# Patient Record
Sex: MALE | Race: OTHER
[De-identification: names, ages, dates, MRNs, and addresses within clinical notes are randomized per-mention and may not be internally consistent; named-entity substitution may affect disease eponyms.]

---

## 2018-07-12 ENCOUNTER — HOSPITAL ENCOUNTER (EMERGENCY)
Dept: HOSPITAL 8 - ED | Age: 43
Discharge: LEFT BEFORE DISCHARGE | End: 2018-07-12
Payer: SELF-PAY

## 2018-07-12 DIAGNOSIS — R06.00: Primary | ICD-10-CM

## 2018-07-22 ENCOUNTER — HOSPITAL ENCOUNTER (EMERGENCY)
Dept: HOSPITAL 8 - ED | Age: 43
Discharge: HOME | End: 2018-07-22
Payer: SELF-PAY

## 2018-07-22 VITALS — HEIGHT: 73 IN | BODY MASS INDEX: 25.13 KG/M2 | WEIGHT: 189.6 LBS

## 2018-07-22 VITALS — DIASTOLIC BLOOD PRESSURE: 82 MMHG | SYSTOLIC BLOOD PRESSURE: 158 MMHG

## 2018-07-22 DIAGNOSIS — F15.10: Primary | ICD-10-CM

## 2018-07-22 DIAGNOSIS — R20.2: ICD-10-CM

## 2018-07-22 DIAGNOSIS — F19.10: ICD-10-CM

## 2018-07-22 DIAGNOSIS — Z72.9: ICD-10-CM

## 2018-07-22 DIAGNOSIS — Z91.14: ICD-10-CM

## 2018-07-22 DIAGNOSIS — F17.200: ICD-10-CM

## 2018-07-22 PROCEDURE — 99283 EMERGENCY DEPT VISIT LOW MDM: CPT

## 2018-08-02 ENCOUNTER — HOSPITAL ENCOUNTER (EMERGENCY)
Dept: HOSPITAL 8 - ED | Age: 43
Discharge: LEFT BEFORE DISCHARGE | End: 2018-08-02
Payer: SELF-PAY

## 2018-08-02 VITALS — HEIGHT: 72 IN | WEIGHT: 191.14 LBS | BODY MASS INDEX: 25.89 KG/M2

## 2018-08-02 VITALS — DIASTOLIC BLOOD PRESSURE: 82 MMHG | SYSTOLIC BLOOD PRESSURE: 115 MMHG

## 2018-08-02 DIAGNOSIS — F17.200: ICD-10-CM

## 2018-08-02 DIAGNOSIS — L02.414: Primary | ICD-10-CM

## 2018-08-02 DIAGNOSIS — F11.10: ICD-10-CM

## 2018-08-02 DIAGNOSIS — F15.10: ICD-10-CM

## 2018-08-02 PROCEDURE — 99281 EMR DPT VST MAYX REQ PHY/QHP: CPT

## 2020-01-21 ENCOUNTER — HOSPITAL ENCOUNTER (INPATIENT)
Dept: HOSPITAL 8 - ED | Age: 45
LOS: 1 days | Discharge: HOME | DRG: 133 | End: 2020-01-22
Attending: HOSPITALIST | Admitting: HOSPITALIST
Payer: MEDICAID

## 2020-01-21 VITALS — DIASTOLIC BLOOD PRESSURE: 76 MMHG | SYSTOLIC BLOOD PRESSURE: 108 MMHG

## 2020-01-21 VITALS — HEIGHT: 74 IN | BODY MASS INDEX: 30.19 KG/M2 | WEIGHT: 235.23 LBS

## 2020-01-21 VITALS — DIASTOLIC BLOOD PRESSURE: 74 MMHG | SYSTOLIC BLOOD PRESSURE: 107 MMHG

## 2020-01-21 DIAGNOSIS — J96.01: Primary | ICD-10-CM

## 2020-01-21 DIAGNOSIS — F32.9: ICD-10-CM

## 2020-01-21 DIAGNOSIS — T40.2X5A: ICD-10-CM

## 2020-01-21 DIAGNOSIS — Z88.8: ICD-10-CM

## 2020-01-21 DIAGNOSIS — J15.9: ICD-10-CM

## 2020-01-21 DIAGNOSIS — G92: ICD-10-CM

## 2020-01-21 DIAGNOSIS — Z87.820: ICD-10-CM

## 2020-01-21 DIAGNOSIS — Z87.891: ICD-10-CM

## 2020-01-21 DIAGNOSIS — F43.10: ICD-10-CM

## 2020-01-21 DIAGNOSIS — F11.10: ICD-10-CM
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
## 2020-01-21 LAB
ALBUMIN SERPL-MCNC: 3.9 G/DL (ref 3.4–5)
ALP SERPL-CCNC: 81 U/L (ref 45–117)
ALT SERPL-CCNC: 100 U/L (ref 12–78)
ANION GAP SERPL CALC-SCNC: 11 MMOL/L (ref 5–15)
BASOPHILS # BLD AUTO: 0.05 X10^3/UL (ref 0–0.1)
BASOPHILS NFR BLD AUTO: 1 % (ref 0–1)
BILIRUB SERPL-MCNC: 1 MG/DL (ref 0.2–1)
CALCIUM SERPL-MCNC: 8.5 MG/DL (ref 8.5–10.1)
CHLORIDE SERPL-SCNC: 105 MMOL/L (ref 98–107)
CREAT SERPL-MCNC: 1.11 MG/DL (ref 0.7–1.3)
EOSINOPHIL # BLD AUTO: 0.16 X10^3/UL (ref 0–0.4)
EOSINOPHIL NFR BLD AUTO: 2 % (ref 1–7)
ERYTHROCYTE [DISTWIDTH] IN BLOOD BY AUTOMATED COUNT: 13.5 % (ref 9.4–14.8)
LYMPHOCYTES # BLD AUTO: 1.55 X10^3/UL (ref 1–3.4)
LYMPHOCYTES NFR BLD AUTO: 17 % (ref 22–44)
MCH RBC QN AUTO: 30.8 PG (ref 27.5–34.5)
MCHC RBC AUTO-ENTMCNC: 34 G/DL (ref 33.2–36.2)
MCV RBC AUTO: 90.6 FL (ref 81–97)
MD: NO
MONOCYTES # BLD AUTO: 0.5 X10^3/UL (ref 0.2–0.8)
MONOCYTES NFR BLD AUTO: 5 % (ref 2–9)
NEUTROPHILS # BLD AUTO: 7.01 X10^3/UL (ref 1.8–6.8)
NEUTROPHILS NFR BLD AUTO: 76 % (ref 42–75)
PLATELET # BLD AUTO: 297 X10^3/UL (ref 130–400)
PMV BLD AUTO: 8.2 FL (ref 7.4–10.4)
PROT SERPL-MCNC: 7.8 G/DL (ref 6.4–8.2)
RBC # BLD AUTO: 5.21 X10^6/UL (ref 4.38–5.82)
TROPONIN I SERPL-MCNC: < 0.015 NG/ML (ref 0–0.04)
VANCOMYCIN TROUGH SERPL-MCNC: 3.3 MG/DL (ref 2.8–20)

## 2020-01-21 PROCEDURE — 83880 ASSAY OF NATRIURETIC PEPTIDE: CPT

## 2020-01-21 PROCEDURE — 87040 BLOOD CULTURE FOR BACTERIA: CPT

## 2020-01-21 PROCEDURE — 84484 ASSAY OF TROPONIN QUANT: CPT

## 2020-01-21 PROCEDURE — 36415 COLL VENOUS BLD VENIPUNCTURE: CPT

## 2020-01-21 PROCEDURE — 87521 HEPATITIS C PROBE&RVRS TRNSC: CPT

## 2020-01-21 PROCEDURE — 96374 THER/PROPH/DIAG INJ IV PUSH: CPT

## 2020-01-21 PROCEDURE — 85025 COMPLETE CBC W/AUTO DIFF WBC: CPT

## 2020-01-21 PROCEDURE — 83605 ASSAY OF LACTIC ACID: CPT

## 2020-01-21 PROCEDURE — 80053 COMPREHEN METABOLIC PANEL: CPT

## 2020-01-21 PROCEDURE — 96361 HYDRATE IV INFUSION ADD-ON: CPT

## 2020-01-21 PROCEDURE — 80307 DRUG TEST PRSMV CHEM ANLYZR: CPT

## 2020-01-21 PROCEDURE — 80074 ACUTE HEPATITIS PANEL: CPT

## 2020-01-21 PROCEDURE — 71275 CT ANGIOGRAPHY CHEST: CPT

## 2020-01-21 PROCEDURE — 93005 ELECTROCARDIOGRAM TRACING: CPT

## 2020-01-21 PROCEDURE — 84145 PROCALCITONIN (PCT): CPT

## 2020-01-21 PROCEDURE — 71045 X-RAY EXAM CHEST 1 VIEW: CPT

## 2020-01-21 PROCEDURE — 96375 TX/PRO/DX INJ NEW DRUG ADDON: CPT

## 2020-01-21 PROCEDURE — 87400 INFLUENZA A/B EACH AG IA: CPT

## 2020-01-21 RX ADMIN — SODIUM CHLORIDE AND POTASSIUM CHLORIDE SCH MLS/HR: .9; .15 SOLUTION INTRAVENOUS at 22:42

## 2020-01-21 NOTE — NUR
PT HAS DC'D OWN PIV IN HAND, STATING IT WAS LEAKING BLOOD. PT EDUCATED NOT TO 
TOUCH LINES. PIV TO LEFT WRIST DC'D WITH TIP INTACT AT PT REQUEST AS THIS PIV 
WAS PAINFUL, RIGHT EJ REMAINS IN PLACE, FLUSHES WITH BLOOD RETURN.

## 2020-01-21 NOTE — NUR
MD Atkinson notified pt meeting sepsis criteria but not septic shock, MD notified 
pt's HR remains tachy at rate 100-110's. MD notified pt has only had total 2L 
NS. MD notified pt has c/o generalized pain. MD declined to order additional 
fluids at this time.

## 2020-01-21 NOTE — NUR
pt standing up trying to void.  assisted pt up out of bed.  pt steady on his 
feet.  pt still unable to void.  pt has urinal at bedside.  pt desats without 
o2 to 82%.  o2 placed back in nose.  will continue to monitor.

## 2020-01-21 NOTE — NUR
TAMEKA COLLINS FROM HOME FOR C/O POSSIBLE OD; PT. DENIES TAKING ANY MEDICAIONS.  PER 
EMS REPORT PT. WENT TO NEIGHBOR'S HOUSE TO REQUEST HELP AND NEIGHBOR'S PLACED 
PT. IN THE SHOWER WITH ALL CLOTING ON AND CALLED EMS.  ALL WET CLOTHING REMOVED 
ON ARRIVAL. 



REMSA STATES RA SAT WAS 50% ON RA ON THEIR ARRIVAL.  2MG NARCAN BY EMS EN 
ROUTE.  PT. REPORTS HX OF TBI. PT. A&O X 4. DENIES ANY PAIN OR DISCOMFORT. 



ON ARRIVAL TO ED PT. SPEAKING NON-STOP WITH NO RESP DISTRESS NOTED.  HOWEVER; 
PT. IS ON 15L NON-REBREATHER.  ATTEMPTED TO DECREASE O2 TO [place]  IMMEDIATE 
DECREASE TO 84% WITH 6L NC.  NON-REBREATHER REPLACED.  EKG DONE ON ARRIVAL.  
ALL MONITORS PLACED.  DR. GONZÁLES WAS IN TO EVAL PT. AND DISCUSS POC.  2ND IV 
ESTABLISHED.

## 2020-01-21 NOTE — NUR
PT BEING GIVEN MULTIPLE CUPS OF WATER TO HELP HIM VOID.  OFFERED TO CATH PT FOR 
EASURINE COLLECTION.  PT REFUSED.  PT CONTINUING TO DRINK WATER.

## 2020-01-21 NOTE — NUR
BREAK RN: PT AWAKE, ASKING FOR "SOMETHING TO DRINK"  DOA RESULTED +OPIATES AND 
AMPHETAMINES.  PT DENIES DRUG USE.  PLAN FOR CTA EXPLAINED TO PATIENT, AND PT 
INSTRUCTED NOTHING TO DRINK UNTIL CT IS COMPLETED.

## 2020-01-21 NOTE — NUR
PT A&OX4, RESPS EVEN AND UNLABORED, SINS TACH RATE 110'S ON CARDIAC MONITOR 
WITH NO ECTOPY. PT REPORTS GENERALIZED PAIN LEVEL 7/10. PT A&OX4, RSPS EVEN AND 
UNLABORED. AWAITING ADMISSION FOR PNA AT THIS TIME.

## 2020-01-21 NOTE — NUR
report received from DESEAN Salgado at bedside. pt a&o to person, place and 
date, disoriented to situation. pt keeps pulling oxygen off and desatting to 
mid 80% on room air. HR sinus tach 110-120. pt attempting to void with urinal 
and unable, refusing straight cath. MD River informed. pt to be admitted. MD 
informed that pt is confused, non-complaint, pinpoint pupils. RN instructed to 
admin 0.4 mg narcan previously ordered. pt placed on high flow oxy mask at 
12L/min, spo2 improved to >90% with this transition.

## 2020-01-21 NOTE — NUR
pt is responsive to narcan, spo2 improved, RR increased to 15 , speech more 
clear ,pupils increased from 2mm to 4mm.

## 2020-01-21 NOTE — NUR
pt continues to be unable to void, refusing cath. ERP Perico notified. Pt 
bladder scanned, residual is 47mL. ERP instructed RN to administer one 
additional liter NS bolus. cxr reviewed by ERP. all monitors in place, pt is 
sinus tach rate 100-110 with no ectopy. spo2 >95% on 12L oxymask. RR 14-20. pt 
is now a&ox4, resps even and unlabored. pt instructed to provide clean catch ua 
when able. urinal at bedside. being monitored closely by this RN.

## 2020-01-21 NOTE — NUR
PIV placed to right EJ by this RN with MD River's ok, MD bains'd use for CTA. Pt 
to CT with this RN and tech, now back in room. Pt tolerated well. pt a&o, resps 
even and unlabored. all monitors in place. VSS. awaiting CTA result and dispo.

## 2020-01-21 NOTE — NUR
PT CONTINUES TO ADAMENTLY REFUSE CATH FOR URINE.  PT CONTINUING TO DRINK WATER, 
HAS NOT MADE ANY URINE YET.

## 2020-01-21 NOTE — NUR
PT. PROVIDED WITH WATER PER REQUEST AND AFTER OK FROM DR. GONZÁLES.  PER DR. GONZÁLES IF PT. RESPIRATIONS DROP/MENTAL STATUS CHANGES GIVE NARCAN; NOT AT THIS 
TIME.  

PT. CONTINUES TO TALK NON-STOP TO RN.  PT. ABLE TO TOLERATE DRINKING WATER 
WITHOUT DIFFICULTY; NOW REQUESTING FOOD.  UPDATED NO FOOD TO BE GIVEN AT THIS 
TIME.  AWAITING X-RAY READ.  PT. REMAINS A&O X 4. 

HAS BEEN UNABLE TO PROVIDE UA THUS FAR; AWARE OF NEED.

## 2020-01-21 NOTE — NUR
CTA results reviewed by WERO River, pt to have blood cx x 2 then abx. pt is 86% 
when trialed on room air, erp notified. oxygen titrated to 4L/min, pt 
tolerating well.

## 2020-01-21 NOTE — NUR
THIS RN HAS ATTEMPTED TO INSERT PIV IN PREFERRED LOCATION FOR CTA (ONLY ACCESS 
IS IN HAND AND LOW WRIST AT THIS TIME), UNSUCCESSFUL X 2. EDTA AT BEDSIDE AT 
THIS TIME TO ATTEMPT. CT AWARE ONLY ACCESS IS HAND/WRIST, CT REQUESTING ACCESS 
HIGHER IN ARM. ERP NIVIA NOTIFIED. PT A&O, RESPS EVEN AND UNLABORED, SINUS 
TACH RATE 100'S ON CARIDAC MONITOR, NO ECTOPY. OXYGEN REQ UNCHANGED. PT TO GO 
TO CT ONCE ADDITIONAL PIV ESTABLISHED.

## 2020-01-21 NOTE — NUR
pt able to void, only a few mLs. ERP Perico notified. Sample labeled and walked 
to lab, lab staff state sample is sufficient to run drug screen. pt is a&o, 
resps even and unlabored, sinus tach rat 110's on cardiac monitor with no 
ectopy. ERP notified pt still requires 12L oxygen via oxymask. ERP ordered 
troponin, BNP and CTA. ERP ok'd RN to continue to run IVF rapidly as pt has 
minimal urine output.

## 2020-01-21 NOTE — NUR
O2 CHANGED TO [place] 4L AT THIS TIME.  PT. HAS BEEN TALKING IN FULL SENTENCES 
NON-STOP SINCE ARRIVAL.  PT. IS MAINTAINING O2 SAT >90% WITH 4L NC.  PT. 
COOPERATIVE WITH STAFF.  ADAMATELY DENIES TAKING ANY DRUGS/MEDS.  DENIES ANY 
SI/HI.  PROVIDING UA AT THIS TIME.  IVF INFUSING PER ORDER.

## 2020-01-21 NOTE — NUR
REPORT GIVEN TO DESEAN ANDINO AT BEDSIDE. PT A&OX4, RESPS EVEN AND UNLABORED. 
SINUS TACH RATE 100'S ON CARDIAC MONITOR WITH NO ECTOPY. [PT INFUSING ZITHROMAX 
ON IV PUMP. PT OPENS EYES SPONTANEOUSLY. PT AWAITING BED ASSIGNMENT AND 
TRANSPORT. NADN AT THIS TIME.

## 2020-01-22 VITALS — SYSTOLIC BLOOD PRESSURE: 104 MMHG | DIASTOLIC BLOOD PRESSURE: 71 MMHG

## 2020-01-22 VITALS — DIASTOLIC BLOOD PRESSURE: 77 MMHG | SYSTOLIC BLOOD PRESSURE: 123 MMHG

## 2020-01-22 LAB
ALBUMIN SERPL-MCNC: 3.1 G/DL (ref 3.4–5)
ALP SERPL-CCNC: 64 U/L (ref 45–117)
ALT SERPL-CCNC: 91 U/L (ref 12–78)
ANION GAP SERPL CALC-SCNC: 7 MMOL/L (ref 5–15)
BASOPHILS # BLD AUTO: 0.03 X10^3/UL (ref 0–0.1)
BASOPHILS NFR BLD AUTO: 0 % (ref 0–1)
BILIRUB SERPL-MCNC: 1 MG/DL (ref 0.2–1)
CALCIUM SERPL-MCNC: 8.4 MG/DL (ref 8.5–10.1)
CHLORIDE SERPL-SCNC: 109 MMOL/L (ref 98–107)
CREAT SERPL-MCNC: 0.83 MG/DL (ref 0.7–1.3)
EOSINOPHIL # BLD AUTO: 0.3 X10^3/UL (ref 0–0.4)
EOSINOPHIL NFR BLD AUTO: 2 % (ref 1–7)
ERYTHROCYTE [DISTWIDTH] IN BLOOD BY AUTOMATED COUNT: 14.1 % (ref 9.4–14.8)
LYMPHOCYTES # BLD AUTO: 1.97 X10^3/UL (ref 1–3.4)
LYMPHOCYTES NFR BLD AUTO: 12 % (ref 22–44)
MCH RBC QN AUTO: 30.1 PG (ref 27.5–34.5)
MCHC RBC AUTO-ENTMCNC: 32.9 G/DL (ref 33.2–36.2)
MCV RBC AUTO: 91.4 FL (ref 81–97)
MD: NO
MONOCYTES # BLD AUTO: 0.59 X10^3/UL (ref 0.2–0.8)
MONOCYTES NFR BLD AUTO: 4 % (ref 2–9)
NEUTROPHILS # BLD AUTO: 13.15 X10^3/UL (ref 1.8–6.8)
NEUTROPHILS NFR BLD AUTO: 82 % (ref 42–75)
PLATELET # BLD AUTO: 226 X10^3/UL (ref 130–400)
PMV BLD AUTO: 8 FL (ref 7.4–10.4)
PROT SERPL-MCNC: 6.5 G/DL (ref 6.4–8.2)
RBC # BLD AUTO: 4.64 X10^6/UL (ref 4.38–5.82)

## 2020-01-22 RX ADMIN — SODIUM CHLORIDE AND POTASSIUM CHLORIDE SCH MLS/HR: .9; .15 SOLUTION INTRAVENOUS at 06:41

## 2020-04-30 ENCOUNTER — HOSPITAL ENCOUNTER (EMERGENCY)
Dept: HOSPITAL 8 - ED | Age: 45
Discharge: LEFT BEFORE DISCHARGE | End: 2020-04-30
Payer: MEDICAID

## 2020-04-30 VITALS — HEIGHT: 74 IN | WEIGHT: 204.81 LBS | BODY MASS INDEX: 26.28 KG/M2

## 2020-04-30 VITALS — DIASTOLIC BLOOD PRESSURE: 93 MMHG | SYSTOLIC BLOOD PRESSURE: 133 MMHG

## 2020-04-30 DIAGNOSIS — Z53.21: ICD-10-CM

## 2020-04-30 DIAGNOSIS — R51: Primary | ICD-10-CM

## 2020-05-08 ENCOUNTER — HOSPITAL ENCOUNTER (EMERGENCY)
Dept: HOSPITAL 8 - ED | Age: 45
Discharge: HOME | End: 2020-05-08
Payer: MEDICAID

## 2020-05-08 VITALS — SYSTOLIC BLOOD PRESSURE: 136 MMHG | DIASTOLIC BLOOD PRESSURE: 98 MMHG

## 2020-05-08 VITALS — WEIGHT: 202.83 LBS | HEIGHT: 74 IN | BODY MASS INDEX: 26.03 KG/M2

## 2020-05-08 DIAGNOSIS — J02.8: Primary | ICD-10-CM

## 2020-05-08 DIAGNOSIS — G44.209: ICD-10-CM

## 2020-05-08 DIAGNOSIS — B97.89: ICD-10-CM

## 2020-05-08 DIAGNOSIS — F41.1: ICD-10-CM

## 2020-05-08 DIAGNOSIS — F17.200: ICD-10-CM

## 2020-05-08 PROCEDURE — 99283 EMERGENCY DEPT VISIT LOW MDM: CPT

## 2020-05-08 NOTE — NUR
Patient presents to ER c/o HA all over head and into neck for hours. Denies 
trauma. Patient states he has had a lot of stress lately. 

Patient is in NAD. Respirations even and unlabored.

## 2020-05-14 ENCOUNTER — HOSPITAL ENCOUNTER (EMERGENCY)
Dept: HOSPITAL 8 - ED | Age: 45
Discharge: HOME | End: 2020-05-14
Payer: MEDICAID

## 2020-05-14 VITALS — BODY MASS INDEX: 26.2 KG/M2 | WEIGHT: 204.15 LBS | HEIGHT: 74 IN

## 2020-05-14 VITALS — DIASTOLIC BLOOD PRESSURE: 92 MMHG | SYSTOLIC BLOOD PRESSURE: 144 MMHG

## 2020-05-14 DIAGNOSIS — R07.89: ICD-10-CM

## 2020-05-14 DIAGNOSIS — F15.90: ICD-10-CM

## 2020-05-14 DIAGNOSIS — F41.1: Primary | ICD-10-CM

## 2020-05-14 DIAGNOSIS — R94.31: ICD-10-CM

## 2020-05-14 PROCEDURE — 71045 X-RAY EXAM CHEST 1 VIEW: CPT

## 2020-05-14 PROCEDURE — 99283 EMERGENCY DEPT VISIT LOW MDM: CPT

## 2020-05-14 PROCEDURE — 93005 ELECTROCARDIOGRAM TRACING: CPT
